# Patient Record
Sex: FEMALE | Race: WHITE | NOT HISPANIC OR LATINO | URBAN - METROPOLITAN AREA
[De-identification: names, ages, dates, MRNs, and addresses within clinical notes are randomized per-mention and may not be internally consistent; named-entity substitution may affect disease eponyms.]

---

## 2020-04-26 ENCOUNTER — EMERGENCY (EMERGENCY)
Facility: HOSPITAL | Age: 65
LOS: 1 days | Discharge: HOME | End: 2020-04-26
Attending: EMERGENCY MEDICINE
Payer: MEDICARE

## 2020-04-26 VITALS
HEIGHT: 64 IN | DIASTOLIC BLOOD PRESSURE: 81 MMHG | HEART RATE: 78 BPM | OXYGEN SATURATION: 98 % | TEMPERATURE: 98 F | WEIGHT: 121.03 LBS | SYSTOLIC BLOOD PRESSURE: 165 MMHG | RESPIRATION RATE: 16 BRPM

## 2020-04-26 PROCEDURE — 70450 CT HEAD/BRAIN W/O DYE: CPT

## 2020-04-26 PROCEDURE — 12001 RPR S/N/AX/GEN/TRNK 2.5CM/<: CPT

## 2020-04-26 PROCEDURE — 99284 EMERGENCY DEPT VISIT MOD MDM: CPT | Mod: 25

## 2020-04-26 PROCEDURE — 90715 TDAP VACCINE 7 YRS/> IM: CPT

## 2020-04-26 PROCEDURE — 70450 CT HEAD/BRAIN W/O DYE: CPT | Mod: 26

## 2020-04-26 RX ORDER — ACETAMINOPHEN 500 MG
650 TABLET ORAL ONCE
Refills: 0 | Status: COMPLETED | OUTPATIENT
Start: 2020-04-26 | End: 2020-04-26

## 2020-04-26 RX ORDER — TETANUS TOXOID, REDUCED DIPHTHERIA TOXOID AND ACELLULAR PERTUSSIS VACCINE, ADSORBED 5; 2.5; 8; 8; 2.5 [IU]/.5ML; [IU]/.5ML; UG/.5ML; UG/.5ML; UG/.5ML
0.5 SUSPENSION INTRAMUSCULAR ONCE
Refills: 0 | Status: COMPLETED | OUTPATIENT
Start: 2020-04-26 | End: 2020-04-26

## 2020-04-26 RX ADMIN — TETANUS TOXOID, REDUCED DIPHTHERIA TOXOID AND ACELLULAR PERTUSSIS VACCINE, ADSORBED 0.5 MILLILITER(S): 5; 2.5; 8; 8; 2.5 SUSPENSION INTRAMUSCULAR at 19:20

## 2020-04-26 RX ADMIN — Medication 650 MILLIGRAM(S): at 19:20

## 2020-04-26 NOTE — ED ADULT TRIAGE NOTE - CHIEF COMPLAINT QUOTE
Pt sustained a trip and fall from standing height and hit posterior head on corner of dresser. Pt neighbor came to help and states had 2 blood soaked towels. Pt denies LOC, thinners and is acting at her baseline and noted to have steady gait.

## 2020-04-26 NOTE — ED PROVIDER NOTE - PATIENT PORTAL LINK FT
You can access the FollowMyHealth Patient Portal offered by [facility] by registering at the following website: [email or web address]. By joining [facility]’s FollowMyHealth portal, you will also be able to view your health information using other applications (apps) compatible with our system.

## 2020-04-26 NOTE — ED ADULT NURSE NOTE - OBJECTIVE STATEMENT
Assumed pt. care at 1920. Pt. A&Ox3. Pt. respirations even and unlabored. Pt. resting comfortably in no apparent distress. Pt. states she fell and hit the side of her head on her dresser. Pt. states she had two blood soaked towels. Bleeding controlled at this time with gauze and ace wrap. Pt. denies blood thinners or LOC.

## 2020-04-26 NOTE — ED PROVIDER NOTE - OBJECTIVE STATEMENT
64 yo female no significant past medical hx fell afyter getting entangled in bed sheets falling backwards hitting the back of her head on wooden dresser no loc no use of blood thinners no headache changes in vision denies neck or back pain. states that she called a neighbor who is a nurse and dressed the back of her head. unsure when last tetanus was. denies cp sob neck or back pain fever or chills.

## 2020-04-26 NOTE — ED PROVIDER NOTE - ATTENDING CONTRIBUTION TO CARE
The patient seen and examined    Scalp Laceration    I, Frank Coleman, performed the initial face to face bedside interview with this patient regarding history of present illness, review of symptoms and relevant past medical, social and family history.  I completed an independent physical examination.  I was the initial provider who evaluated this patient. I have signed out the follow up of any pending tests (i.e. labs, radiological studies) to the ACP.  I have communicated the patient’s plan of care and disposition with the ACP.

## 2020-04-26 NOTE — ED PROVIDER NOTE - PHYSICAL EXAMINATION
right parietal 1 cm laceration with active bleeding with contusion.   eyes fide eomi   nose no epistaxis   spine no midline pt vertebral or pt vertebral tenderness

## 2020-04-26 NOTE — ED PROVIDER NOTE - NSFOLLOWUPINSTRUCTIONS_ED_ALL_ED_FT
staple removal in 7-10 days   monitor for severe headache puss from wound changes in vision lethargy, severe nausea, intolerance to food or liquid, fever or chills. if any of these symptoms please return to the ER for further evaluation   tylenol for headaches     Closed Head Injury  A closed head injury is an injury to your head that may or may not involve a traumatic brain injury (TBI).  A CT scan of the head may not have been performed because they are usually normal after a concussion. Concussions are diagnosed and managed based on the history given and the symptoms experienced after the head injury. Most concussions do not cause serious problem and get better over several days.  Symptoms of TBI can be short or long lasting and include headache, dizziness, interference with memory or speech, fatigue, confusion, changes in sleep, mood changes, nausea, depression/anxiety, and dulling of senses. Make sure to obtain proper rest which includes getting plenty of sleep, avoiding excessive visual stimulation, and avoiding activities that may cause physical or mental stress. Avoid any situation where there is potential for another head injury, including sports.    SEEK IMMEDIATE MEDICAL CARE IF YOU HAVE ANY OF THE FOLLOWING SYMPTOMS: unusual drowsiness, vomiting, severe dizziness, seizures, lightheadedness, muscular weakness, different pupil sizes, visual changes, or clear or bloody discharge from your ears or nose.

## 2020-05-04 ENCOUNTER — EMERGENCY (EMERGENCY)
Facility: HOSPITAL | Age: 65
LOS: 1 days | Discharge: HOME | End: 2020-05-04
Attending: STUDENT IN AN ORGANIZED HEALTH CARE EDUCATION/TRAINING PROGRAM
Payer: MEDICARE

## 2020-05-04 VITALS
TEMPERATURE: 98 F | OXYGEN SATURATION: 98 % | WEIGHT: 121.92 LBS | HEIGHT: 64 IN | HEART RATE: 83 BPM | SYSTOLIC BLOOD PRESSURE: 157 MMHG | RESPIRATION RATE: 18 BRPM | DIASTOLIC BLOOD PRESSURE: 82 MMHG

## 2020-05-04 PROCEDURE — L9995: CPT

## 2020-05-04 PROCEDURE — G0463: CPT

## 2020-05-04 NOTE — ED STATDOCS - ATTENDING CONTRIBUTION TO CARE
I performed a face to face history and physical exam of the patient and discussed their management with the resident/ACP. I reviewed the resident/ACP's note and agree with the documented findings and plan of care.    Pt with scalp laceration stapled 1 week ago. no other complaints. no fever. no drainage.    physical - wound c/d/i    plan - staples removed. will d/c to home.

## 2020-05-04 NOTE — ED STATDOCS - OBJECTIVE STATEMENT
Pt is a 65 y.o. F hx of fall one week ago s/p staple insertion. The pt denies any drainage or pain from wound. Feels well. Denies fever, sweats, chills, chest pain, shortness of breath, abdominal pain, nausea, diarrhea, constipation, numbness, tingling or weakness.

## 2020-05-04 NOTE — ED STATDOCS - NSFOLLOWUPINSTRUCTIONS_ED_ALL_ED_FT
Stitches, Staples, or Adhesive Wound Closure    Health care providers use stitches (sutures), staples, and certain glue (skin adhesives) to hold skin together while it heals (wound closure). You may need this treatment after you have surgery or if you cut your skin accidentally. These methods help your skin to heal more quickly and make it less likely that you will have a scar. A wound may take several months to heal completely.    The type of wound you have determines when your wound gets closed. In most cases, the wound is closed as soon as possible (primary skin closure). Sometimes, closure is delayed so the wound can be cleaned and allowed to heal naturally. This reduces the chance of infection. Delayed closure may be needed if your wound:    Is caused by a bite.  Happened more than 6 hours ago.  Involves loss of skin or the tissues under the skin.  Has dirt or debris in it that cannot be removed.  Is infected.    What are the different kinds of wound closures?  There are many options for wound closure. The one that your health care provider uses depends on how deep and how large your wound is.    Adhesive Glue    To use this type of glue to close a wound, your health care provider holds the edges of the wound together and paints the glue on the surface of your skin. You may need more than one layer of glue. Then the wound may be covered with a light bandage (dressing).    This type of skin closure may be used for small wounds that are not deep (superficial). Using glue for wound closure is less painful than other methods. It does not require a medicine that numbs the area (local anesthetic). This method also leaves nothing to be removed. Adhesive glue is often used for children and on facial wounds.    Adhesive glue cannot be used for wounds that are deep, uneven, or bleeding. It is not used inside of a wound.    Adhesive Strips    These strips are made of sticky (adhesive), porous paper. They are applied across your skin edges like a regular adhesive bandage. You leave them on until they fall off.    Adhesive strips may be used to close very superficial wounds. They may also be used along with sutures to improve the closure of your skin edges.    Sutures    Sutures are the oldest method of wound closure. Sutures can be made from natural substances, such as silk, or from synthetic materials, such as nylon and steel. They can be made from a material that your body can break down as your wound heals (absorbable), or they can be made from a material that needs to be removed from your skin (nonabsorbable). They come in many different strengths and sizes.    Your health care provider attaches the sutures to a steel needle on one end. Sutures can be passed through your skin, or through the tissues beneath your skin. Then they are tied and cut. Your skin edges may be closed in one continuous stitch or in separate stitches.    Sutures are strong and can be used for all kinds of wounds. Absorbable sutures may be used to close tissues under the skin. The disadvantage of sutures is that they may cause skin reactions that lead to infection. Nonabsorbable sutures need to be removed.    Staples    When surgical staples are used to close a wound, the edges of your skin on both sides of the wound are brought close together. A staple is placed across the wound, and an instrument secures the edges together. Staples are often used to close surgical cuts (incisions).    Staples are faster to use than sutures, and they cause less skin reaction. Staples need to be removed using a tool that bends the staples away from your skin.    How do I care for my wound closure?  Take medicines only as directed by your health care provider.  If you were prescribed an antibiotic medicine for your wound, finish it all even if you start to feel better.  Use ointments or creams only as directed by your health care provider.  Wash your hands with soap and water before and after touching your wound.  Do not soak your wound in water. Do not take baths, swim, or use a hot tub until your health care provider approves.  Ask your health care provider when you can start showering. Cover your wound if directed by your health care provider.  Do not take out your own sutures or staples.  Do not pick at your wound. Picking can cause an infection.  Keep all follow-up visits as directed by your health care provider. This is important.    How long will I have my wound closure?  Leave adhesive glue on your skin until the glue peels away.  Leave adhesive strips on your skin until the strips fall off.  Absorbable sutures will dissolve within several days.  Nonabsorbable sutures and staples must be removed. The location of the wound will determine how long they stay in. This can range from several days to a couple of weeks.    When should I seek help for my wound closure?  Contact your health care provider if:    You have a fever.  You have chills.  You have drainage, redness, swelling, or pain at your wound.  There is a bad smell coming from your wound.  The skin edges of your wound start to separate after your sutures have been removed.  Your wound becomes thick, raised, and darker in color after your sutures come out (scarring).    ADDITIONAL NOTES AND INSTRUCTIONS    Please follow up with your Primary MD in 24-48 hr.  Seek immediate medical care for any new/worsening signs or symptoms.

## 2020-05-04 NOTE — ED STATDOCS - NS ED ROS FT
Constitutional: no fever, sweats, and no chills.  Eyes: no pain, no redness, and no visual changes.  ENMT: no ear pain and no hearing problems, no nasal congestion/drainage, no dysphagia, and no throat pain, no neck pain, no stiffness  CV: no chest pain, no edema.  Resp: no cough, no dyspnea  GI: no abdominal pain, no bloating, no constipation, no diarrhea, no nausea and no vomiting.  : no dysuria, no hematuria  MSK: no msk pain, no weakness  Skin: no rashes.  Neuro: no LOC, no headache, no sensory deficits, and no weakness.

## 2020-05-04 NOTE — ED STATDOCS - CLINICAL SUMMARY MEDICAL DECISION MAKING FREE TEXT BOX
Pt is a 65 y.o. F presenting with stapled scalp wound, no evidence of infection. Will removal staples, given return precautions and advised to follow up with PMD.

## 2020-05-04 NOTE — ED STATDOCS - PATIENT PORTAL LINK FT
You can access the FollowMyHealth Patient Portal offered by [facility] by registering at the following website: [email or web address]. By joining [facility]’s FollowMyHealth portal, you will also be able to view your health information using other applications (apps) compatible with our system.

## 2020-05-04 NOTE — ED STATDOCS - PHYSICAL EXAMINATION
General: well appearing, NAD  HEENT: NC, AT, EOMI, PERRLA, no scleral icterus, MMM  Cardiac: RRR, no m/r/g, no lower extremity edema  Respiratory: CTABL, no wheezes/rales/rhonchi, equal chest wall expansions  Abdomen: soft, ND, NT, no rebound tenderness, no guarding, nonperitonitic  MSK/Vascular: full ROM, warm extremities  Skin: Well healed wound on right occipital scalp with six staples in place, no evidence of infection including no erythema/swelling/drainage/tenderness  Neuro: AAOx3, negative pronator drift, strength 5/5, sensation to light touch intact, finger to nose coordination intact, cranial nerves 2-12 intact  Psych: calm, cooperative, normal affect

## 2022-02-24 PROBLEM — Z78.9 OTHER SPECIFIED HEALTH STATUS: Chronic | Status: ACTIVE | Noted: 2020-05-04

## 2022-03-26 PROBLEM — Z00.00 ENCOUNTER FOR PREVENTIVE HEALTH EXAMINATION: Status: ACTIVE | Noted: 2022-03-26

## 2022-03-28 ENCOUNTER — OUTPATIENT (OUTPATIENT)
Dept: OUTPATIENT SERVICES | Facility: HOSPITAL | Age: 67
LOS: 1 days | End: 2022-03-28

## 2022-03-28 ENCOUNTER — APPOINTMENT (OUTPATIENT)
Dept: ULTRASOUND IMAGING | Facility: CLINIC | Age: 67
End: 2022-03-28
Payer: MEDICARE

## 2022-03-28 DIAGNOSIS — R22.31 LOCALIZED SWELLING, MASS AND LUMP, RIGHT UPPER LIMB: ICD-10-CM

## 2022-03-28 PROCEDURE — 76882 US LMTD JT/FCL EVL NVASC XTR: CPT | Mod: 26,RT

## 2022-05-23 ENCOUNTER — APPOINTMENT (OUTPATIENT)
Dept: ULTRASOUND IMAGING | Facility: CLINIC | Age: 67
End: 2022-05-23
Payer: MEDICARE

## 2022-05-23 ENCOUNTER — OUTPATIENT (OUTPATIENT)
Dept: OUTPATIENT SERVICES | Facility: HOSPITAL | Age: 67
LOS: 1 days | End: 2022-05-23

## 2022-05-23 DIAGNOSIS — Z00.00 ENCOUNTER FOR GENERAL ADULT MEDICAL EXAMINATION WITHOUT ABNORMAL FINDINGS: ICD-10-CM

## 2022-05-23 PROCEDURE — 20604 DRAIN/INJ JOINT/BURSA W/US: CPT | Mod: RT

## 2022-12-01 ENCOUNTER — OFFICE (OUTPATIENT)
Dept: URBAN - METROPOLITAN AREA CLINIC 113 | Facility: CLINIC | Age: 67
Setting detail: OPHTHALMOLOGY
End: 2022-12-01
Payer: MEDICARE

## 2022-12-01 DIAGNOSIS — H04.123: ICD-10-CM

## 2022-12-01 DIAGNOSIS — H47.233: ICD-10-CM

## 2022-12-01 DIAGNOSIS — H25.13: ICD-10-CM

## 2022-12-01 PROCEDURE — 92014 COMPRE OPH EXAM EST PT 1/>: CPT | Performed by: OPHTHALMOLOGY

## 2022-12-01 PROCEDURE — 92133 CPTRZD OPH DX IMG PST SGM ON: CPT | Performed by: OPHTHALMOLOGY

## 2022-12-01 ASSESSMENT — KERATOMETRY
OS_K2POWER_DIOPTERS: 44.00
OS_K1POWER_DIOPTERS: 42.75
OS_AXISANGLE_DEGREES: 175
OD_K1POWER_DIOPTERS: 43.00
OD_AXISANGLE_DEGREES: 015
OD_K2POWER_DIOPTERS: 43.75
METHOD_AUTO_MANUAL: AUTO

## 2022-12-01 ASSESSMENT — AXIALLENGTH_DERIVED
OD_AL: 23.6379
OS_AL: 23.3482

## 2022-12-01 ASSESSMENT — REFRACTION_AUTOREFRACTION
OD_AXIS: 100
OS_CYLINDER: -1.50
OS_AXIS: 088
OS_SPHERE: +1.50
OD_CYLINDER: -1.00
OD_SPHERE: +0.50

## 2022-12-01 ASSESSMENT — LID EXAM ASSESSMENTS
OS_MEIBOMITIS: T
OD_MEIBOMITIS: T

## 2022-12-01 ASSESSMENT — SPHEQUIV_DERIVED
OS_SPHEQUIV: 0.75
OD_SPHEQUIV: 0

## 2022-12-01 ASSESSMENT — CONFRONTATIONAL VISUAL FIELD TEST (CVF)
OD_FINDINGS: FULL
OS_FINDINGS: FULL

## 2022-12-01 ASSESSMENT — PACHYMETRY
OD_CT_CORRECTION: 2
OS_CT_UM: 508
OS_CT_CORRECTION: 3
OD_CT_UM: 512

## 2022-12-01 ASSESSMENT — SUPERFICIAL PUNCTATE KERATITIS (SPK)
OD_SPK: 1+ 2+
OS_SPK: 1+ 2+

## 2022-12-01 ASSESSMENT — TONOMETRY
OD_IOP_MMHG: 13
OS_IOP_MMHG: 13

## 2022-12-01 ASSESSMENT — VISUAL ACUITY
OS_BCVA: 20/25
OD_BCVA: 20/40

## 2023-01-25 ENCOUNTER — OFFICE (OUTPATIENT)
Dept: URBAN - METROPOLITAN AREA CLINIC 94 | Facility: CLINIC | Age: 68
Setting detail: OPHTHALMOLOGY
End: 2023-01-25
Payer: MEDICARE

## 2023-01-25 DIAGNOSIS — H43.811: ICD-10-CM

## 2023-01-25 DIAGNOSIS — H35.3222: ICD-10-CM

## 2023-01-25 DIAGNOSIS — H35.3111: ICD-10-CM

## 2023-01-25 PROCEDURE — 92014 COMPRE OPH EXAM EST PT 1/>: CPT | Performed by: OPHTHALMOLOGY

## 2023-01-25 PROCEDURE — 92134 CPTRZ OPH DX IMG PST SGM RTA: CPT | Performed by: OPHTHALMOLOGY

## 2023-01-25 ASSESSMENT — VISUAL ACUITY
OD_BCVA: 20/60-1
OS_BCVA: 20/25

## 2023-01-25 ASSESSMENT — KERATOMETRY
OS_K1POWER_DIOPTERS: 42.75
METHOD_AUTO_MANUAL: AUTO
OD_K1POWER_DIOPTERS: 43.00
OD_AXISANGLE_DEGREES: 015
OS_K2POWER_DIOPTERS: 44.00
OS_AXISANGLE_DEGREES: 175
OD_K2POWER_DIOPTERS: 43.75

## 2023-01-25 ASSESSMENT — REFRACTION_AUTOREFRACTION
OS_SPHERE: +1.50
OD_SPHERE: +0.50
OS_AXIS: 088
OD_AXIS: 100
OS_CYLINDER: -1.50
OD_CYLINDER: -1.00

## 2023-01-25 ASSESSMENT — PACHYMETRY
OD_CT_CORRECTION: 2
OS_CT_CORRECTION: 3
OS_CT_UM: 508
OD_CT_UM: 512

## 2023-01-25 ASSESSMENT — AXIALLENGTH_DERIVED
OD_AL: 23.6379
OS_AL: 23.3482

## 2023-01-25 ASSESSMENT — CONFRONTATIONAL VISUAL FIELD TEST (CVF)
OD_FINDINGS: FULL
OS_FINDINGS: FULL

## 2023-01-25 ASSESSMENT — SPHEQUIV_DERIVED
OS_SPHEQUIV: 0.75
OD_SPHEQUIV: 0

## 2023-01-25 ASSESSMENT — LID EXAM ASSESSMENTS
OS_MEIBOMITIS: T
OD_MEIBOMITIS: T

## 2023-01-25 ASSESSMENT — SUPERFICIAL PUNCTATE KERATITIS (SPK)
OD_SPK: 1+ 2+
OS_SPK: 1+ 2+

## 2023-01-25 ASSESSMENT — TONOMETRY
OD_IOP_MMHG: 17
OS_IOP_MMHG: 17

## 2023-04-12 ENCOUNTER — OFFICE (OUTPATIENT)
Dept: URBAN - METROPOLITAN AREA CLINIC 103 | Facility: CLINIC | Age: 68
Setting detail: OPHTHALMOLOGY
End: 2023-04-12
Payer: MEDICARE

## 2023-04-12 DIAGNOSIS — H43.393: ICD-10-CM

## 2023-04-12 DIAGNOSIS — H35.3221: ICD-10-CM

## 2023-04-12 DIAGNOSIS — H43.811: ICD-10-CM

## 2023-04-12 DIAGNOSIS — H35.3111: ICD-10-CM

## 2023-04-12 PROCEDURE — 92014 COMPRE OPH EXAM EST PT 1/>: CPT | Performed by: OPHTHALMOLOGY

## 2023-04-12 PROCEDURE — 92134 CPTRZ OPH DX IMG PST SGM RTA: CPT | Performed by: OPHTHALMOLOGY

## 2023-04-12 PROCEDURE — 67028 INJECTION EYE DRUG: CPT | Performed by: OPHTHALMOLOGY

## 2023-04-12 ASSESSMENT — REFRACTION_AUTOREFRACTION
OS_AXIS: 088
OD_SPHERE: +0.50
OS_SPHERE: +1.50
OD_CYLINDER: -1.00
OS_CYLINDER: -1.50
OD_AXIS: 100

## 2023-04-12 ASSESSMENT — LID EXAM ASSESSMENTS
OS_MEIBOMITIS: T
OD_MEIBOMITIS: T

## 2023-04-12 ASSESSMENT — SUPERFICIAL PUNCTATE KERATITIS (SPK)
OS_SPK: 1+ 2+
OD_SPK: 1+ 2+

## 2023-04-12 ASSESSMENT — PACHYMETRY
OS_CT_CORRECTION: 3
OD_CT_UM: 512
OD_CT_CORRECTION: 2
OS_CT_UM: 508

## 2023-04-12 ASSESSMENT — CONFRONTATIONAL VISUAL FIELD TEST (CVF)
OS_FINDINGS: FULL
OD_FINDINGS: FULL

## 2023-04-12 ASSESSMENT — TONOMETRY
OS_IOP_MMHG: 17
OD_IOP_MMHG: 16

## 2023-04-12 ASSESSMENT — VISUAL ACUITY
OD_BCVA: 20/70-2
OS_BCVA: 20/25

## 2023-04-12 ASSESSMENT — KERATOMETRY
METHOD_AUTO_MANUAL: AUTO
OS_K2POWER_DIOPTERS: 44.00
OD_AXISANGLE_DEGREES: 015
OS_K1POWER_DIOPTERS: 42.75
OD_K2POWER_DIOPTERS: 43.75
OS_AXISANGLE_DEGREES: 175
OD_K1POWER_DIOPTERS: 43.00

## 2023-04-12 ASSESSMENT — SPHEQUIV_DERIVED
OD_SPHEQUIV: 0
OS_SPHEQUIV: 0.75

## 2023-04-12 ASSESSMENT — AXIALLENGTH_DERIVED
OD_AL: 23.6379
OS_AL: 23.3482

## 2023-04-17 ENCOUNTER — APPOINTMENT (OUTPATIENT)
Dept: ULTRASOUND IMAGING | Facility: CLINIC | Age: 68
End: 2023-04-17
Payer: MEDICARE

## 2023-04-17 ENCOUNTER — OUTPATIENT (OUTPATIENT)
Dept: OUTPATIENT SERVICES | Facility: HOSPITAL | Age: 68
LOS: 1 days | End: 2023-04-17

## 2023-04-17 DIAGNOSIS — R80.1 PERSISTENT PROTEINURIA, UNSPECIFIED: ICD-10-CM

## 2023-04-17 PROCEDURE — 76770 US EXAM ABDO BACK WALL COMP: CPT | Mod: 26

## 2023-04-25 ENCOUNTER — OFFICE (OUTPATIENT)
Dept: URBAN - METROPOLITAN AREA CLINIC 94 | Facility: CLINIC | Age: 68
Setting detail: OPHTHALMOLOGY
End: 2023-04-25
Payer: MEDICARE

## 2023-04-25 DIAGNOSIS — H43.811: ICD-10-CM

## 2023-04-25 DIAGNOSIS — H35.3124: ICD-10-CM

## 2023-04-25 DIAGNOSIS — H35.3111: ICD-10-CM

## 2023-04-25 DIAGNOSIS — H35.3222: ICD-10-CM

## 2023-04-25 DIAGNOSIS — H43.393: ICD-10-CM

## 2023-04-25 PROCEDURE — 92134 CPTRZ OPH DX IMG PST SGM RTA: CPT | Performed by: OPHTHALMOLOGY

## 2023-04-25 PROCEDURE — 92012 INTRM OPH EXAM EST PATIENT: CPT | Performed by: OPHTHALMOLOGY

## 2023-04-25 ASSESSMENT — VISUAL ACUITY
OS_BCVA: 20/25-1
OD_BCVA: 20/70

## 2023-04-25 ASSESSMENT — PACHYMETRY
OS_CT_CORRECTION: 3
OD_CT_UM: 512
OD_CT_CORRECTION: 2
OS_CT_UM: 508

## 2023-04-25 ASSESSMENT — LID EXAM ASSESSMENTS
OD_MEIBOMITIS: T
OS_MEIBOMITIS: T

## 2023-04-25 ASSESSMENT — AXIALLENGTH_DERIVED
OD_AL: 23.6379
OS_AL: 23.3482

## 2023-04-25 ASSESSMENT — REFRACTION_AUTOREFRACTION
OD_SPHERE: +0.50
OD_CYLINDER: -1.00
OS_SPHERE: +1.50
OS_CYLINDER: -1.50
OS_AXIS: 088
OD_AXIS: 100

## 2023-04-25 ASSESSMENT — KERATOMETRY
METHOD_AUTO_MANUAL: AUTO
OS_K2POWER_DIOPTERS: 44.00
OD_AXISANGLE_DEGREES: 015
OD_K2POWER_DIOPTERS: 43.75
OS_AXISANGLE_DEGREES: 175
OS_K1POWER_DIOPTERS: 42.75
OD_K1POWER_DIOPTERS: 43.00

## 2023-04-25 ASSESSMENT — SUPERFICIAL PUNCTATE KERATITIS (SPK)
OD_SPK: 1+ 2+
OS_SPK: 1+ 2+

## 2023-04-25 ASSESSMENT — SPHEQUIV_DERIVED
OD_SPHEQUIV: 0
OS_SPHEQUIV: 0.75

## 2023-04-25 ASSESSMENT — CONFRONTATIONAL VISUAL FIELD TEST (CVF)
OD_FINDINGS: FULL
OS_FINDINGS: FULL

## 2023-04-25 ASSESSMENT — TONOMETRY
OS_IOP_MMHG: 17
OD_IOP_MMHG: 16

## 2023-06-01 ENCOUNTER — OFFICE (OUTPATIENT)
Dept: URBAN - METROPOLITAN AREA CLINIC 113 | Facility: CLINIC | Age: 68
Setting detail: OPHTHALMOLOGY
End: 2023-06-01
Payer: MEDICARE

## 2023-06-01 DIAGNOSIS — H43.393: ICD-10-CM

## 2023-06-01 DIAGNOSIS — H47.233: ICD-10-CM

## 2023-06-01 DIAGNOSIS — H25.13: ICD-10-CM

## 2023-06-01 PROCEDURE — 92083 EXTENDED VISUAL FIELD XM: CPT | Performed by: OPHTHALMOLOGY

## 2023-06-01 PROCEDURE — 92014 COMPRE OPH EXAM EST PT 1/>: CPT | Performed by: OPHTHALMOLOGY

## 2023-06-01 PROCEDURE — 92250 FUNDUS PHOTOGRAPHY W/I&R: CPT | Performed by: OPHTHALMOLOGY

## 2023-06-01 ASSESSMENT — SUPERFICIAL PUNCTATE KERATITIS (SPK)
OS_SPK: 1+ 2+
OD_SPK: 1+ 2+

## 2023-06-01 ASSESSMENT — KERATOMETRY
OD_K2POWER_DIOPTERS: 43.75
OS_AXISANGLE_DEGREES: 179
OD_K1POWER_DIOPTERS: 43.00
METHOD_AUTO_MANUAL: AUTO
OD_AXISANGLE_DEGREES: 017
OS_K2POWER_DIOPTERS: 44.00
OS_K1POWER_DIOPTERS: 42.75

## 2023-06-01 ASSESSMENT — PACHYMETRY
OS_CT_UM: 508
OS_CT_CORRECTION: 3
OD_CT_UM: 512
OD_CT_CORRECTION: 2

## 2023-06-01 ASSESSMENT — CONFRONTATIONAL VISUAL FIELD TEST (CVF)
OS_FINDINGS: FULL
OD_FINDINGS: FULL

## 2023-06-01 ASSESSMENT — TONOMETRY
OD_IOP_MMHG: 15
OS_IOP_MMHG: 15

## 2023-06-01 ASSESSMENT — REFRACTION_AUTOREFRACTION
OD_SPHERE: +0.50
OD_AXIS: 100
OS_CYLINDER: -1.50
OS_SPHERE: +1.75
OD_CYLINDER: -1.00
OS_AXIS: 095

## 2023-06-01 ASSESSMENT — AXIALLENGTH_DERIVED
OD_AL: 23.6379
OS_AL: 23.2531

## 2023-06-01 ASSESSMENT — LID EXAM ASSESSMENTS
OD_MEIBOMITIS: T
OS_MEIBOMITIS: T

## 2023-06-01 ASSESSMENT — SPHEQUIV_DERIVED
OS_SPHEQUIV: 1
OD_SPHEQUIV: 0

## 2023-06-01 ASSESSMENT — VISUAL ACUITY
OD_BCVA: 20/150
OS_BCVA: 20/25

## 2023-06-20 ENCOUNTER — OFFICE (OUTPATIENT)
Dept: URBAN - METROPOLITAN AREA CLINIC 94 | Facility: CLINIC | Age: 68
Setting detail: OPHTHALMOLOGY
End: 2023-06-20
Payer: MEDICARE

## 2023-06-20 DIAGNOSIS — H35.3111: ICD-10-CM

## 2023-06-20 DIAGNOSIS — H35.3124: ICD-10-CM

## 2023-06-20 DIAGNOSIS — H43.811: ICD-10-CM

## 2023-06-20 DIAGNOSIS — H35.3222: ICD-10-CM

## 2023-06-20 PROCEDURE — 92134 CPTRZ OPH DX IMG PST SGM RTA: CPT | Performed by: OPHTHALMOLOGY

## 2023-06-20 PROCEDURE — 67028 INJECTION EYE DRUG: CPT | Performed by: OPHTHALMOLOGY

## 2023-06-20 ASSESSMENT — REFRACTION_AUTOREFRACTION
OS_AXIS: 095
OD_AXIS: 100
OS_SPHERE: +1.75
OD_CYLINDER: -1.00
OS_CYLINDER: -1.50
OD_SPHERE: +0.50

## 2023-06-20 ASSESSMENT — CONFRONTATIONAL VISUAL FIELD TEST (CVF)
OS_FINDINGS: FULL
OD_FINDINGS: FULL

## 2023-06-20 ASSESSMENT — KERATOMETRY
OS_K2POWER_DIOPTERS: 44.00
OD_K1POWER_DIOPTERS: 43.00
OS_K1POWER_DIOPTERS: 42.75
OS_AXISANGLE_DEGREES: 179
OD_K2POWER_DIOPTERS: 43.75
OD_AXISANGLE_DEGREES: 017
METHOD_AUTO_MANUAL: AUTO

## 2023-06-20 ASSESSMENT — VISUAL ACUITY
OS_BCVA: 20/25-
OD_BCVA: 20/150

## 2023-06-20 ASSESSMENT — PACHYMETRY
OD_CT_UM: 512
OD_CT_CORRECTION: 2
OS_CT_CORRECTION: 3
OS_CT_UM: 508

## 2023-06-20 ASSESSMENT — LID EXAM ASSESSMENTS
OD_MEIBOMITIS: T
OS_MEIBOMITIS: T

## 2023-06-20 ASSESSMENT — SPHEQUIV_DERIVED
OD_SPHEQUIV: 0
OS_SPHEQUIV: 1

## 2023-06-20 ASSESSMENT — SUPERFICIAL PUNCTATE KERATITIS (SPK)
OD_SPK: 1+ 2+
OS_SPK: 1+ 2+

## 2023-06-20 ASSESSMENT — AXIALLENGTH_DERIVED
OD_AL: 23.6379
OS_AL: 23.2531

## 2023-06-20 ASSESSMENT — TONOMETRY
OS_IOP_MMHG: 17
OD_IOP_MMHG: 17

## 2023-09-06 ENCOUNTER — OFFICE (OUTPATIENT)
Dept: URBAN - METROPOLITAN AREA CLINIC 94 | Facility: CLINIC | Age: 68
Setting detail: OPHTHALMOLOGY
End: 2023-09-06
Payer: MEDICARE

## 2023-09-06 DIAGNOSIS — H43.811: ICD-10-CM

## 2023-09-06 DIAGNOSIS — H35.3131: ICD-10-CM

## 2023-09-06 DIAGNOSIS — H35.3222: ICD-10-CM

## 2023-09-06 PROBLEM — H35.3111 AGE RELATED MACULAR DEGENERATION DRY; RIGHT EYE EARLY, LEFT EYE EARLY: Status: ACTIVE | Noted: 2023-09-06

## 2023-09-06 PROBLEM — H35.3121 AGE RELATED MACULAR DEGENERATION DRY; RIGHT EYE EARLY, LEFT EYE EARLY: Status: ACTIVE | Noted: 2023-09-06

## 2023-09-06 PROCEDURE — 92134 CPTRZ OPH DX IMG PST SGM RTA: CPT | Performed by: OPHTHALMOLOGY

## 2023-09-06 PROCEDURE — 92012 INTRM OPH EXAM EST PATIENT: CPT | Performed by: OPHTHALMOLOGY

## 2023-09-06 ASSESSMENT — SUPERFICIAL PUNCTATE KERATITIS (SPK)
OS_SPK: 1+ 2+
OD_SPK: 1+ 2+

## 2023-09-06 ASSESSMENT — VISUAL ACUITY
OD_BCVA: 20/80-1
OS_BCVA: 20/25-1

## 2023-09-06 ASSESSMENT — KERATOMETRY
OD_K2POWER_DIOPTERS: 43.75
OS_AXISANGLE_DEGREES: 179
OD_AXISANGLE_DEGREES: 017
OD_K1POWER_DIOPTERS: 43.00
METHOD_AUTO_MANUAL: AUTO
OS_K2POWER_DIOPTERS: 44.00
OS_K1POWER_DIOPTERS: 42.75

## 2023-09-06 ASSESSMENT — AXIALLENGTH_DERIVED
OS_AL: 23.2531
OD_AL: 23.6379

## 2023-09-06 ASSESSMENT — LID EXAM ASSESSMENTS
OS_MEIBOMITIS: T
OD_MEIBOMITIS: T

## 2023-09-06 ASSESSMENT — PACHYMETRY
OD_CT_CORRECTION: 2
OS_CT_UM: 508
OD_CT_UM: 512
OS_CT_CORRECTION: 3

## 2023-09-06 ASSESSMENT — REFRACTION_AUTOREFRACTION
OS_SPHERE: +1.75
OS_AXIS: 095
OD_CYLINDER: -1.00
OS_CYLINDER: -1.50
OD_SPHERE: +0.50
OD_AXIS: 100

## 2023-09-06 ASSESSMENT — TONOMETRY
OD_IOP_MMHG: 14
OS_IOP_MMHG: 18

## 2023-09-06 ASSESSMENT — CONFRONTATIONAL VISUAL FIELD TEST (CVF)
OD_FINDINGS: FULL
OS_FINDINGS: FULL

## 2023-09-06 ASSESSMENT — SPHEQUIV_DERIVED
OS_SPHEQUIV: 1
OD_SPHEQUIV: 0

## 2023-12-06 ENCOUNTER — OFFICE (OUTPATIENT)
Dept: URBAN - METROPOLITAN AREA CLINIC 94 | Facility: CLINIC | Age: 68
Setting detail: OPHTHALMOLOGY
End: 2023-12-06
Payer: MEDICARE

## 2023-12-06 DIAGNOSIS — H43.811: ICD-10-CM

## 2023-12-06 DIAGNOSIS — H35.3222: ICD-10-CM

## 2023-12-06 DIAGNOSIS — H35.3131: ICD-10-CM

## 2023-12-06 PROCEDURE — 92012 INTRM OPH EXAM EST PATIENT: CPT | Performed by: OPHTHALMOLOGY

## 2023-12-06 PROCEDURE — 92134 CPTRZ OPH DX IMG PST SGM RTA: CPT | Performed by: OPHTHALMOLOGY

## 2023-12-06 ASSESSMENT — SPHEQUIV_DERIVED
OS_SPHEQUIV: 1
OD_SPHEQUIV: 0

## 2023-12-06 ASSESSMENT — REFRACTION_AUTOREFRACTION
OD_CYLINDER: -1.00
OS_CYLINDER: -1.50
OS_SPHERE: +1.75
OS_AXIS: 095
OD_AXIS: 100
OD_SPHERE: +0.50

## 2023-12-06 ASSESSMENT — LID EXAM ASSESSMENTS
OS_MEIBOMITIS: T
OD_MEIBOMITIS: T

## 2023-12-06 ASSESSMENT — CONFRONTATIONAL VISUAL FIELD TEST (CVF)
OD_FINDINGS: FULL
OS_FINDINGS: FULL

## 2023-12-06 ASSESSMENT — SUPERFICIAL PUNCTATE KERATITIS (SPK)
OD_SPK: 1+ 2+
OS_SPK: 1+ 2+

## 2023-12-07 ENCOUNTER — OFFICE (OUTPATIENT)
Dept: URBAN - METROPOLITAN AREA CLINIC 113 | Facility: CLINIC | Age: 68
Setting detail: OPHTHALMOLOGY
End: 2023-12-07
Payer: MEDICARE

## 2023-12-07 DIAGNOSIS — H47.233: ICD-10-CM

## 2023-12-07 DIAGNOSIS — H43.393: ICD-10-CM

## 2023-12-07 DIAGNOSIS — H25.13: ICD-10-CM

## 2023-12-07 PROCEDURE — 92012 INTRM OPH EXAM EST PATIENT: CPT | Performed by: OPHTHALMOLOGY

## 2023-12-07 PROCEDURE — 92250 FUNDUS PHOTOGRAPHY W/I&R: CPT | Performed by: OPHTHALMOLOGY

## 2023-12-07 ASSESSMENT — REFRACTION_AUTOREFRACTION
OS_CYLINDER: -1.50
OS_SPHERE: +1.75
OD_AXIS: 100
OD_SPHERE: +0.50
OD_CYLINDER: -1.00
OS_AXIS: 095

## 2023-12-07 ASSESSMENT — LID EXAM ASSESSMENTS
OS_MEIBOMITIS: T
OD_MEIBOMITIS: T

## 2023-12-07 ASSESSMENT — CONFRONTATIONAL VISUAL FIELD TEST (CVF)
OD_FINDINGS: FULL
OS_FINDINGS: FULL

## 2023-12-07 ASSESSMENT — SUPERFICIAL PUNCTATE KERATITIS (SPK)
OS_SPK: 1+ 2+
OD_SPK: 1+ 2+

## 2023-12-07 ASSESSMENT — SPHEQUIV_DERIVED
OD_SPHEQUIV: 0
OS_SPHEQUIV: 1

## 2024-06-11 ENCOUNTER — OFFICE (OUTPATIENT)
Dept: URBAN - METROPOLITAN AREA CLINIC 94 | Facility: CLINIC | Age: 69
Setting detail: OPHTHALMOLOGY
End: 2024-06-11
Payer: MEDICARE

## 2024-06-11 DIAGNOSIS — H35.3222: ICD-10-CM

## 2024-06-11 DIAGNOSIS — H43.811: ICD-10-CM

## 2024-06-11 DIAGNOSIS — H35.3131: ICD-10-CM

## 2024-06-11 PROCEDURE — 92235 FLUORESCEIN ANGRPH MLTIFRAME: CPT | Performed by: OPHTHALMOLOGY

## 2024-06-11 PROCEDURE — 92134 CPTRZ OPH DX IMG PST SGM RTA: CPT | Performed by: OPHTHALMOLOGY

## 2024-06-11 PROCEDURE — 92014 COMPRE OPH EXAM EST PT 1/>: CPT | Performed by: OPHTHALMOLOGY

## 2024-06-11 ASSESSMENT — CONFRONTATIONAL VISUAL FIELD TEST (CVF)
OD_FINDINGS: FULL
OS_FINDINGS: FULL

## 2024-06-11 ASSESSMENT — LID EXAM ASSESSMENTS
OD_MEIBOMITIS: T
OS_MEIBOMITIS: T

## 2024-06-13 ENCOUNTER — RX ONLY (RX ONLY)
Age: 69
End: 2024-06-13

## 2024-06-13 ENCOUNTER — OFFICE (OUTPATIENT)
Dept: URBAN - METROPOLITAN AREA CLINIC 113 | Facility: CLINIC | Age: 69
Setting detail: OPHTHALMOLOGY
End: 2024-06-13
Payer: MEDICARE

## 2024-06-13 DIAGNOSIS — H43.393: ICD-10-CM

## 2024-06-13 DIAGNOSIS — H25.13: ICD-10-CM

## 2024-06-13 DIAGNOSIS — H47.233: ICD-10-CM

## 2024-06-13 PROCEDURE — 92012 INTRM OPH EXAM EST PATIENT: CPT | Performed by: OPHTHALMOLOGY

## 2024-06-13 PROCEDURE — 92020 GONIOSCOPY: CPT | Performed by: OPHTHALMOLOGY

## 2024-06-13 PROCEDURE — 92133 CPTRZD OPH DX IMG PST SGM ON: CPT | Performed by: OPHTHALMOLOGY

## 2024-06-13 PROCEDURE — 92083 EXTENDED VISUAL FIELD XM: CPT | Performed by: OPHTHALMOLOGY

## 2024-06-13 ASSESSMENT — LID EXAM ASSESSMENTS
OD_MEIBOMITIS: T
OS_MEIBOMITIS: T

## 2024-06-13 ASSESSMENT — CONFRONTATIONAL VISUAL FIELD TEST (CVF)
OD_FINDINGS: FULL
OS_FINDINGS: FULL

## 2024-10-09 ENCOUNTER — OFFICE (OUTPATIENT)
Dept: URBAN - METROPOLITAN AREA CLINIC 103 | Facility: CLINIC | Age: 69
Setting detail: OPHTHALMOLOGY
End: 2024-10-09
Payer: MEDICARE

## 2024-10-09 DIAGNOSIS — H35.3222: ICD-10-CM

## 2024-10-09 DIAGNOSIS — H35.3124: ICD-10-CM

## 2024-10-09 PROCEDURE — 92134 CPTRZ OPH DX IMG PST SGM RTA: CPT | Performed by: OPHTHALMOLOGY

## 2024-10-09 PROCEDURE — 67028 INJECTION EYE DRUG: CPT | Mod: LT | Performed by: OPHTHALMOLOGY

## 2024-10-09 ASSESSMENT — SUPERFICIAL PUNCTATE KERATITIS (SPK)
OS_SPK: 1+ 2+
OD_SPK: 1+ 2+

## 2024-10-09 ASSESSMENT — TONOMETRY
OS_IOP_MMHG: 11
OD_IOP_MMHG: 16

## 2024-10-09 ASSESSMENT — LID EXAM ASSESSMENTS
OD_MEIBOMITIS: T
OS_MEIBOMITIS: T

## 2024-10-09 ASSESSMENT — CONFRONTATIONAL VISUAL FIELD TEST (CVF)
OS_FINDINGS: FULL
OD_FINDINGS: FULL

## 2024-10-09 ASSESSMENT — PACHYMETRY
OS_CT_CORRECTION: 3
OD_CT_CORRECTION: 2
OD_CT_UM: 512
OS_CT_UM: 508

## 2024-10-18 PROBLEM — H35.3111 AGE RELATED MACULAR DEGENERATION DRY; RIGHT EYE EARLY, LEFT EYE ADV ATROPHIC W SUBFOVEAL INVOLVEMENT: Status: ACTIVE | Noted: 2024-10-09

## 2024-10-18 PROBLEM — H35.3124 AGE RELATED MACULAR DEGENERATION DRY; RIGHT EYE EARLY, LEFT EYE ADV ATROPHIC W SUBFOVEAL INVOLVEMENT: Status: ACTIVE | Noted: 2024-10-09

## 2024-10-18 ASSESSMENT — REFRACTION_AUTOREFRACTION
OD_CYLINDER: -1.00
OD_AXIS: 099
OS_CYLINDER: -1.50
OD_SPHERE: +0.50
OS_SPHERE: +1.50
OS_AXIS: 090

## 2024-10-18 ASSESSMENT — KERATOMETRY
OD_AXISANGLE_DEGREES: 017
METHOD_AUTO_MANUAL: AUTO
OS_AXISANGLE_DEGREES: 178
OS_K1POWER_DIOPTERS: 42.75
OS_K2POWER_DIOPTERS: 44.00
OD_K2POWER_DIOPTERS: 44.00
OD_K1POWER_DIOPTERS: 42.75

## 2024-10-18 ASSESSMENT — VISUAL ACUITY
OD_BCVA: 20/60
OS_BCVA: 20/30

## 2024-12-19 ENCOUNTER — OFFICE (OUTPATIENT)
Dept: URBAN - METROPOLITAN AREA CLINIC 113 | Facility: CLINIC | Age: 69
Setting detail: OPHTHALMOLOGY
End: 2024-12-19
Payer: MEDICARE

## 2024-12-19 DIAGNOSIS — H25.13: ICD-10-CM

## 2024-12-19 DIAGNOSIS — H47.233: ICD-10-CM

## 2024-12-19 DIAGNOSIS — H43.393: ICD-10-CM

## 2024-12-19 PROCEDURE — 92014 COMPRE OPH EXAM EST PT 1/>: CPT | Performed by: OPHTHALMOLOGY

## 2024-12-19 PROCEDURE — 92250 FUNDUS PHOTOGRAPHY W/I&R: CPT | Performed by: OPHTHALMOLOGY

## 2024-12-19 ASSESSMENT — REFRACTION_AUTOREFRACTION
OS_SPHERE: +1.50
OD_SPHERE: +0.25
OD_AXIS: 096
OS_AXIS: 092
OS_CYLINDER: -1.50
OD_CYLINDER: -1.00

## 2024-12-19 ASSESSMENT — KERATOMETRY
OS_K1POWER_DIOPTERS: 43.00
OS_AXISANGLE_DEGREES: 174
OD_K2POWER_DIOPTERS: 44.00
OD_K1POWER_DIOPTERS: 43.25
OD_AXISANGLE_DEGREES: 016
OS_K2POWER_DIOPTERS: 44.00
METHOD_AUTO_MANUAL: AUTO

## 2024-12-19 ASSESSMENT — PACHYMETRY
OS_CT_CORRECTION: 3
OS_CT_UM: 508
OD_CT_UM: 512
OD_CT_CORRECTION: 2

## 2024-12-19 ASSESSMENT — TONOMETRY
OS_IOP_MMHG: 15
OD_IOP_MMHG: 15

## 2024-12-19 ASSESSMENT — SUPERFICIAL PUNCTATE KERATITIS (SPK)
OD_SPK: 1+ 2+
OS_SPK: 1+ 2+

## 2024-12-19 ASSESSMENT — VISUAL ACUITY
OD_BCVA: 20/70
OS_BCVA: 20/25-1

## 2024-12-19 ASSESSMENT — CONFRONTATIONAL VISUAL FIELD TEST (CVF)
OD_FINDINGS: FULL
OS_FINDINGS: FULL

## 2024-12-19 ASSESSMENT — LID EXAM ASSESSMENTS
OS_MEIBOMITIS: T
OD_MEIBOMITIS: T

## 2025-01-07 ENCOUNTER — OFFICE (OUTPATIENT)
Dept: URBAN - METROPOLITAN AREA CLINIC 94 | Facility: CLINIC | Age: 70
Setting detail: OPHTHALMOLOGY
End: 2025-01-07
Payer: MEDICARE

## 2025-01-07 DIAGNOSIS — H35.3124: ICD-10-CM

## 2025-01-07 DIAGNOSIS — H35.3222: ICD-10-CM

## 2025-01-07 PROCEDURE — 92235 FLUORESCEIN ANGRPH MLTIFRAME: CPT | Performed by: OPHTHALMOLOGY

## 2025-01-07 PROCEDURE — 67028 INJECTION EYE DRUG: CPT | Mod: LT | Performed by: OPHTHALMOLOGY

## 2025-01-07 ASSESSMENT — TONOMETRY
OD_IOP_MMHG: 16
OS_IOP_MMHG: 17

## 2025-01-07 ASSESSMENT — KERATOMETRY
OS_K1POWER_DIOPTERS: 43.00
OD_AXISANGLE_DEGREES: 016
OD_K2POWER_DIOPTERS: 44.00
OS_AXISANGLE_DEGREES: 174
OD_K1POWER_DIOPTERS: 43.25
METHOD_AUTO_MANUAL: AUTO
OS_K2POWER_DIOPTERS: 44.00

## 2025-01-07 ASSESSMENT — SUPERFICIAL PUNCTATE KERATITIS (SPK)
OD_SPK: 1+ 2+
OS_SPK: 1+ 2+

## 2025-01-07 ASSESSMENT — PACHYMETRY
OD_CT_CORRECTION: 2
OS_CT_UM: 508
OS_CT_CORRECTION: 3
OD_CT_UM: 512

## 2025-01-07 ASSESSMENT — REFRACTION_AUTOREFRACTION
OD_AXIS: 096
OD_SPHERE: +0.25
OD_CYLINDER: -1.00
OS_CYLINDER: -1.50
OS_SPHERE: +1.50
OS_AXIS: 092

## 2025-01-07 ASSESSMENT — LID EXAM ASSESSMENTS
OS_MEIBOMITIS: T
OD_MEIBOMITIS: T

## 2025-01-07 ASSESSMENT — VISUAL ACUITY
OS_BCVA: 20/30
OD_BCVA: 20/80-1

## 2025-01-07 ASSESSMENT — CONFRONTATIONAL VISUAL FIELD TEST (CVF)
OD_FINDINGS: FULL
OS_FINDINGS: FULL

## 2025-03-04 ENCOUNTER — OFFICE (OUTPATIENT)
Dept: URBAN - METROPOLITAN AREA CLINIC 94 | Facility: CLINIC | Age: 70
Setting detail: OPHTHALMOLOGY
End: 2025-03-04
Payer: MEDICARE

## 2025-03-04 DIAGNOSIS — H35.3124: ICD-10-CM

## 2025-03-04 DIAGNOSIS — H35.3222: ICD-10-CM

## 2025-03-04 PROCEDURE — 92134 CPTRZ OPH DX IMG PST SGM RTA: CPT | Performed by: OPHTHALMOLOGY

## 2025-03-04 PROCEDURE — 67028 INJECTION EYE DRUG: CPT | Mod: LT | Performed by: OPHTHALMOLOGY

## 2025-03-04 ASSESSMENT — KERATOMETRY
OS_AXISANGLE_DEGREES: 174
OD_K1POWER_DIOPTERS: 43.25
OD_AXISANGLE_DEGREES: 016
OS_K2POWER_DIOPTERS: 44.00
OS_K1POWER_DIOPTERS: 43.00
METHOD_AUTO_MANUAL: AUTO
OD_K2POWER_DIOPTERS: 44.00

## 2025-03-04 ASSESSMENT — TONOMETRY
OD_IOP_MMHG: 15
OS_IOP_MMHG: 14

## 2025-03-04 ASSESSMENT — SUPERFICIAL PUNCTATE KERATITIS (SPK)
OS_SPK: 1+ 2+
OD_SPK: 1+ 2+

## 2025-03-04 ASSESSMENT — REFRACTION_AUTOREFRACTION
OD_SPHERE: +0.25
OS_SPHERE: +1.50
OD_CYLINDER: -1.00
OD_AXIS: 096
OS_AXIS: 092
OS_CYLINDER: -1.50

## 2025-03-04 ASSESSMENT — CONFRONTATIONAL VISUAL FIELD TEST (CVF)
OS_FINDINGS: FULL
OD_FINDINGS: FULL

## 2025-03-04 ASSESSMENT — PACHYMETRY
OD_CT_CORRECTION: 2
OS_CT_CORRECTION: 3
OD_CT_UM: 512
OS_CT_UM: 508

## 2025-03-04 ASSESSMENT — VISUAL ACUITY
OD_BCVA: 20/50-
OS_BCVA: 20/25-

## 2025-03-04 ASSESSMENT — LID EXAM ASSESSMENTS
OS_MEIBOMITIS: T
OD_MEIBOMITIS: T

## 2025-03-11 ENCOUNTER — OFFICE (OUTPATIENT)
Dept: URBAN - METROPOLITAN AREA CLINIC 94 | Facility: CLINIC | Age: 70
Setting detail: OPHTHALMOLOGY
End: 2025-03-11
Payer: MEDICARE

## 2025-03-11 DIAGNOSIS — H35.3111: ICD-10-CM

## 2025-03-11 DIAGNOSIS — H35.3222: ICD-10-CM

## 2025-03-11 DIAGNOSIS — H35.3124: ICD-10-CM

## 2025-03-11 PROCEDURE — 99213 OFFICE O/P EST LOW 20 MIN: CPT | Performed by: OPHTHALMOLOGY

## 2025-03-11 PROCEDURE — 92134 CPTRZ OPH DX IMG PST SGM RTA: CPT | Performed by: OPHTHALMOLOGY

## 2025-03-11 ASSESSMENT — REFRACTION_AUTOREFRACTION
OD_SPHERE: +0.75
OD_AXIS: 098
OS_SPHERE: +1.75
OD_CYLINDER: -1.25
OS_AXIS: 089
OS_CYLINDER: -1.50

## 2025-03-11 ASSESSMENT — TONOMETRY
OS_IOP_MMHG: 18
OD_IOP_MMHG: 15
OS_IOP_MMHG: 17

## 2025-03-11 ASSESSMENT — PACHYMETRY
OD_CT_CORRECTION: 2
OD_CT_UM: 512
OS_CT_CORRECTION: 3
OS_CT_UM: 508

## 2025-03-11 ASSESSMENT — KERATOMETRY
OD_AXISANGLE_DEGREES: 015
OD_K1POWER_DIOPTERS: 43.00
OS_K2POWER_DIOPTERS: 44.00
OD_K2POWER_DIOPTERS: 43.75
METHOD_AUTO_MANUAL: AUTO
OS_K1POWER_DIOPTERS: 42.75
OS_AXISANGLE_DEGREES: 174

## 2025-03-11 ASSESSMENT — REFRACTION_MANIFEST
OS_AXIS: 070
OS_CYLINDER: -1.50
OS_VA1: 20/50-
OS_SPHERE: +1.75

## 2025-03-11 ASSESSMENT — SUPERFICIAL PUNCTATE KERATITIS (SPK)
OS_SPK: 1+ 2+
OD_SPK: 1+ 2+

## 2025-03-11 ASSESSMENT — VISUAL ACUITY
OD_BCVA: 20/200
OS_BCVA: 20/30-1

## 2025-03-11 ASSESSMENT — LID EXAM ASSESSMENTS
OS_MEIBOMITIS: T
OD_MEIBOMITIS: T

## 2025-03-11 ASSESSMENT — CONFRONTATIONAL VISUAL FIELD TEST (CVF)
OD_FINDINGS: FULL
OS_FINDINGS: FULL

## 2025-04-02 ENCOUNTER — OFFICE (OUTPATIENT)
Dept: URBAN - METROPOLITAN AREA CLINIC 94 | Facility: CLINIC | Age: 70
Setting detail: OPHTHALMOLOGY
End: 2025-04-02
Payer: MEDICARE

## 2025-04-02 DIAGNOSIS — H35.3222: ICD-10-CM

## 2025-04-02 PROCEDURE — 67028 INJECTION EYE DRUG: CPT | Mod: LT | Performed by: OPHTHALMOLOGY

## 2025-04-02 PROCEDURE — 92134 CPTRZ OPH DX IMG PST SGM RTA: CPT | Performed by: OPHTHALMOLOGY

## 2025-04-02 ASSESSMENT — KERATOMETRY
METHOD_AUTO_MANUAL: AUTO
OD_AXISANGLE_DEGREES: 015
OD_K1POWER_DIOPTERS: 43.00
OD_K2POWER_DIOPTERS: 43.75
OS_AXISANGLE_DEGREES: 174
OS_K2POWER_DIOPTERS: 44.00
OS_K1POWER_DIOPTERS: 42.75

## 2025-04-02 ASSESSMENT — LID EXAM ASSESSMENTS
OD_MEIBOMITIS: T
OS_MEIBOMITIS: T

## 2025-04-02 ASSESSMENT — REFRACTION_AUTOREFRACTION
OS_SPHERE: +1.75
OS_AXIS: 089
OD_SPHERE: +0.75
OD_AXIS: 098
OS_CYLINDER: -1.50
OD_CYLINDER: -1.25

## 2025-04-02 ASSESSMENT — SUPERFICIAL PUNCTATE KERATITIS (SPK)
OS_SPK: 1+ 2+
OD_SPK: 1+ 2+

## 2025-04-02 ASSESSMENT — CONFRONTATIONAL VISUAL FIELD TEST (CVF)
OD_FINDINGS: FULL
OS_FINDINGS: FULL

## 2025-04-02 ASSESSMENT — VISUAL ACUITY
OS_BCVA: 20/25
OD_BCVA: 20/100

## 2025-05-08 ENCOUNTER — OFFICE (OUTPATIENT)
Dept: URBAN - METROPOLITAN AREA CLINIC 94 | Facility: CLINIC | Age: 70
Setting detail: OPHTHALMOLOGY
End: 2025-05-08
Payer: MEDICARE

## 2025-05-08 DIAGNOSIS — H01.014: ICD-10-CM

## 2025-05-08 DIAGNOSIS — H25.13: ICD-10-CM

## 2025-05-08 DIAGNOSIS — H01.011: ICD-10-CM

## 2025-05-08 PROBLEM — H52.4 PRESBYOPIA: Status: ACTIVE | Noted: 2025-05-08

## 2025-05-08 PROCEDURE — 92014 COMPRE OPH EXAM EST PT 1/>: CPT | Performed by: OPTOMETRIST

## 2025-05-08 ASSESSMENT — REFRACTION_CURRENTRX
OS_VPRISM_DIRECTION: SV
OD_VPRISM_DIRECTION: SV
OS_SPHERE: +3.25
OD_SPHERE: PLANO
OD_AXIS: 095
OS_AXIS: 085
OD_AXIS: 095
OS_OVR_VA: 20/
OS_AXIS: 085
OS_SPHERE: +1.00
OS_OVR_VA: 20/
OD_OVR_VA: 20/
OS_CYLINDER: -1.25
OD_VPRISM_DIRECTION: SV
OS_VPRISM_DIRECTION: SV
OD_CYLINDER: -0.75
OD_OVR_VA: 20/
OD_SPHERE: +2.25
OD_CYLINDER: -0.75
OS_CYLINDER: -1.25

## 2025-05-08 ASSESSMENT — SUPERFICIAL PUNCTATE KERATITIS (SPK)
OD_SPK: 1+ 2+
OS_SPK: 1+ 2+

## 2025-05-08 ASSESSMENT — REFRACTION_MANIFEST
OD_AXIS: 095
OS_AXIS: 095
OD_VA1: 20/25-
OD_CYLINDER: -1.00
OS_ADD: +2.50
OS_CYLINDER: -1.00
OS_SPHERE: +1.50
OD_ADD: +2.50
OD_SPHERE: +0.50
OU_VA: 20/30
OS_VA1: 20/50

## 2025-05-08 ASSESSMENT — REFRACTION_AUTOREFRACTION
OS_AXIS: 100
OD_CYLINDER: -1.00
OS_SPHERE: +1.75
OD_AXIS: 095
OD_SPHERE: +0.50
OS_CYLINDER: -1.25

## 2025-05-08 ASSESSMENT — KERATOMETRY
METHOD_AUTO_MANUAL: AUTO
OS_K2POWER_DIOPTERS: 44.00
OS_AXISANGLE_DEGREES: 180
OD_AXISANGLE_DEGREES: 020
OD_K1POWER_DIOPTERS: 43.00
OD_K2POWER_DIOPTERS: 44.00
OS_K1POWER_DIOPTERS: 42.75

## 2025-05-08 ASSESSMENT — VISUAL ACUITY
OS_BCVA: 20/25
OD_BCVA: 20/60

## 2025-05-08 ASSESSMENT — PACHYMETRY
OS_CT_UM: 508
OD_CT_CORRECTION: 2
OD_CT_UM: 512
OS_CT_CORRECTION: 3

## 2025-05-08 ASSESSMENT — LID EXAM ASSESSMENTS
OD_MEIBOMITIS: T
OS_MEIBOMITIS: T

## 2025-05-08 ASSESSMENT — TONOMETRY
OD_IOP_MMHG: 15
OS_IOP_MMHG: 17

## 2025-05-08 ASSESSMENT — CONFRONTATIONAL VISUAL FIELD TEST (CVF)
OS_FINDINGS: FULL
OD_FINDINGS: FULL

## 2025-06-04 ENCOUNTER — OFFICE (OUTPATIENT)
Dept: URBAN - METROPOLITAN AREA CLINIC 94 | Facility: CLINIC | Age: 70
Setting detail: OPHTHALMOLOGY
End: 2025-06-04
Payer: MEDICARE

## 2025-06-04 DIAGNOSIS — H35.3222: ICD-10-CM

## 2025-06-04 DIAGNOSIS — H35.3124: ICD-10-CM

## 2025-06-04 PROCEDURE — 92134 CPTRZ OPH DX IMG PST SGM RTA: CPT | Performed by: OPHTHALMOLOGY

## 2025-06-04 PROCEDURE — 67028 INJECTION EYE DRUG: CPT | Mod: LT | Performed by: OPHTHALMOLOGY

## 2025-06-04 ASSESSMENT — CONFRONTATIONAL VISUAL FIELD TEST (CVF)
OD_FINDINGS: FULL
OS_FINDINGS: FULL

## 2025-06-04 ASSESSMENT — TONOMETRY
OD_IOP_MMHG: 15
OS_IOP_MMHG: 17

## 2025-06-04 ASSESSMENT — LID EXAM ASSESSMENTS
OS_MEIBOMITIS: T
OD_MEIBOMITIS: T

## 2025-06-04 ASSESSMENT — PACHYMETRY
OS_CT_CORRECTION: 3
OD_CT_CORRECTION: 2
OD_CT_UM: 512
OS_CT_UM: 508

## 2025-06-04 ASSESSMENT — SUPERFICIAL PUNCTATE KERATITIS (SPK)
OS_SPK: 1+ 2+
OD_SPK: 1+ 2+

## 2025-06-06 ASSESSMENT — KERATOMETRY
OS_K2POWER_DIOPTERS: 44.00
OD_K2POWER_DIOPTERS: 44.00
OD_AXISANGLE_DEGREES: 020
OS_K1POWER_DIOPTERS: 42.75
OD_K1POWER_DIOPTERS: 43.00
OS_AXISANGLE_DEGREES: 180
METHOD_AUTO_MANUAL: AUTO

## 2025-06-06 ASSESSMENT — REFRACTION_CURRENTRX
OD_VPRISM_DIRECTION: SV
OD_CYLINDER: -0.75
OS_CYLINDER: -1.25
OD_SPHERE: PLANO
OS_SPHERE: +3.25
OD_CYLINDER: -0.75
OS_AXIS: 085
OS_SPHERE: +1.00
OD_AXIS: 095
OD_SPHERE: +2.25
OS_AXIS: 085
OD_OVR_VA: 20/
OD_AXIS: 095
OD_VPRISM_DIRECTION: SV
OS_VPRISM_DIRECTION: SV
OS_OVR_VA: 20/
OD_OVR_VA: 20/
OS_OVR_VA: 20/
OS_CYLINDER: -1.25
OS_VPRISM_DIRECTION: SV

## 2025-06-06 ASSESSMENT — REFRACTION_MANIFEST
OU_VA: 20/30
OD_CYLINDER: -1.00
OS_VA1: 20/50
OD_ADD: +2.50
OD_SPHERE: +0.50
OS_CYLINDER: -1.00
OD_AXIS: 095
OS_ADD: +2.50
OS_AXIS: 095
OD_VA1: 20/25-
OS_SPHERE: +1.50

## 2025-06-06 ASSESSMENT — REFRACTION_AUTOREFRACTION
OS_CYLINDER: -1.25
OD_AXIS: 095
OD_SPHERE: +0.50
OS_AXIS: 100
OD_CYLINDER: -1.00
OS_SPHERE: +1.75

## 2025-06-06 ASSESSMENT — VISUAL ACUITY
OD_BCVA: 20/100
OS_BCVA: 20/25

## 2025-06-19 ENCOUNTER — OFFICE (OUTPATIENT)
Dept: URBAN - METROPOLITAN AREA CLINIC 113 | Facility: CLINIC | Age: 70
Setting detail: OPHTHALMOLOGY
End: 2025-06-19
Payer: MEDICARE

## 2025-06-19 DIAGNOSIS — H04.123: ICD-10-CM

## 2025-06-19 DIAGNOSIS — H43.393: ICD-10-CM

## 2025-06-19 DIAGNOSIS — H47.233: ICD-10-CM

## 2025-06-19 PROCEDURE — 92133 CPTRZD OPH DX IMG PST SGM ON: CPT | Performed by: OPHTHALMOLOGY

## 2025-06-19 PROCEDURE — 92083 EXTENDED VISUAL FIELD XM: CPT | Performed by: OPHTHALMOLOGY

## 2025-06-19 PROCEDURE — 92020 GONIOSCOPY: CPT | Performed by: OPHTHALMOLOGY

## 2025-06-19 PROCEDURE — 92014 COMPRE OPH EXAM EST PT 1/>: CPT | Performed by: OPHTHALMOLOGY

## 2025-06-19 ASSESSMENT — REFRACTION_MANIFEST
OD_CYLINDER: -1.00
OS_AXIS: 095
OU_VA: 20/30
OS_ADD: +2.50
OD_SPHERE: +0.50
OS_CYLINDER: -1.00
OS_SPHERE: +1.50
OD_AXIS: 095
OS_VA1: 20/50
OD_ADD: +2.50
OD_VA1: 20/25-

## 2025-06-19 ASSESSMENT — REFRACTION_CURRENTRX
OD_VPRISM_DIRECTION: SV
OD_SPHERE: +2.25
OD_OVR_VA: 20/
OS_SPHERE: +1.00
OS_VPRISM_DIRECTION: SV
OS_AXIS: 085
OS_CYLINDER: -1.25
OS_OVR_VA: 20/
OD_OVR_VA: 20/
OD_SPHERE: PLANO
OD_VPRISM_DIRECTION: SV
OS_VPRISM_DIRECTION: SV
OS_SPHERE: +3.25
OS_AXIS: 085
OD_CYLINDER: -0.75
OD_CYLINDER: -0.75
OD_AXIS: 095
OS_CYLINDER: -1.25
OS_OVR_VA: 20/
OD_AXIS: 095

## 2025-06-19 ASSESSMENT — SUPERFICIAL PUNCTATE KERATITIS (SPK)
OD_SPK: 1+ 2+
OS_SPK: 1+ 2+

## 2025-06-19 ASSESSMENT — KERATOMETRY
OD_AXISANGLE_DEGREES: 014
OS_K2POWER_DIOPTERS: 44.00
OD_K1POWER_DIOPTERS: 43.00
OS_K1POWER_DIOPTERS: 42.75
OS_AXISANGLE_DEGREES: 177
METHOD_AUTO_MANUAL: AUTO
OD_K2POWER_DIOPTERS: 43.75

## 2025-06-19 ASSESSMENT — REFRACTION_AUTOREFRACTION
OS_SPHERE: +1.75
OD_SPHERE: +0.75
OS_CYLINDER: -1.25
OS_AXIS: 092
OD_AXIS: 099
OD_CYLINDER: -1.25

## 2025-06-19 ASSESSMENT — VISUAL ACUITY
OD_BCVA: 20/100
OS_BCVA: 20/30-

## 2025-06-19 ASSESSMENT — PACHYMETRY
OD_CT_CORRECTION: 2
OS_CT_CORRECTION: 3
OS_CT_UM: 508
OD_CT_UM: 512

## 2025-06-19 ASSESSMENT — CONFRONTATIONAL VISUAL FIELD TEST (CVF)
OS_FINDINGS: FULL
OD_FINDINGS: FULL

## 2025-06-19 ASSESSMENT — TONOMETRY
OD_IOP_MMHG: 13
OS_IOP_MMHG: 13

## 2025-06-19 ASSESSMENT — LID EXAM ASSESSMENTS
OS_MEIBOMITIS: T
OD_MEIBOMITIS: T

## 2025-08-06 ENCOUNTER — OFFICE (OUTPATIENT)
Dept: URBAN - METROPOLITAN AREA CLINIC 94 | Facility: CLINIC | Age: 70
Setting detail: OPHTHALMOLOGY
End: 2025-08-06
Payer: MEDICARE

## 2025-08-06 DIAGNOSIS — H35.3124: ICD-10-CM

## 2025-08-06 DIAGNOSIS — H35.3222: ICD-10-CM

## 2025-08-06 DIAGNOSIS — H35.3113: ICD-10-CM

## 2025-08-06 PROCEDURE — 92134 CPTRZ OPH DX IMG PST SGM RTA: CPT | Performed by: OPHTHALMOLOGY

## 2025-08-06 PROCEDURE — 67028 INJECTION EYE DRUG: CPT | Mod: 50 | Performed by: OPHTHALMOLOGY

## 2025-08-06 ASSESSMENT — REFRACTION_CURRENTRX
OS_SPHERE: +1.00
OS_OVR_VA: 20/
OD_OVR_VA: 20/
OD_VPRISM_DIRECTION: SV
OS_CYLINDER: -1.25
OS_CYLINDER: -1.25
OS_SPHERE: +3.25
OS_VPRISM_DIRECTION: SV
OD_CYLINDER: -0.75
OD_CYLINDER: -0.75
OD_OVR_VA: 20/
OD_SPHERE: +2.25
OD_AXIS: 095
OS_OVR_VA: 20/
OD_SPHERE: PLANO
OS_VPRISM_DIRECTION: SV
OS_AXIS: 085
OD_VPRISM_DIRECTION: SV
OD_AXIS: 095
OS_AXIS: 085

## 2025-08-06 ASSESSMENT — LID EXAM ASSESSMENTS
OS_MEIBOMITIS: T
OD_MEIBOMITIS: T

## 2025-08-06 ASSESSMENT — REFRACTION_AUTOREFRACTION
OS_CYLINDER: -1.25
OD_AXIS: 099
OD_SPHERE: +0.75
OS_SPHERE: +1.75
OS_AXIS: 092
OD_CYLINDER: -1.25

## 2025-08-06 ASSESSMENT — TONOMETRY
OD_IOP_MMHG: 14
OS_IOP_MMHG: 17

## 2025-08-06 ASSESSMENT — VISUAL ACUITY
OS_BCVA: 20/25-1
OD_BCVA: 20/100

## 2025-08-06 ASSESSMENT — SUPERFICIAL PUNCTATE KERATITIS (SPK)
OS_SPK: 1+ 2+
OD_SPK: 1+ 2+

## 2025-08-06 ASSESSMENT — KERATOMETRY
OD_K1POWER_DIOPTERS: 43.00
OD_K2POWER_DIOPTERS: 43.75
METHOD_AUTO_MANUAL: AUTO
OS_AXISANGLE_DEGREES: 177
OD_AXISANGLE_DEGREES: 014
OS_K1POWER_DIOPTERS: 42.75
OS_K2POWER_DIOPTERS: 44.00

## 2025-08-06 ASSESSMENT — PACHYMETRY
OS_CT_UM: 508
OS_CT_CORRECTION: 3
OD_CT_CORRECTION: 2
OD_CT_UM: 512

## 2025-08-06 ASSESSMENT — CONFRONTATIONAL VISUAL FIELD TEST (CVF)
OD_FINDINGS: FULL
OS_FINDINGS: FULL